# Patient Record
Sex: FEMALE | Race: WHITE | Employment: UNEMPLOYED | URBAN - METROPOLITAN AREA
[De-identification: names, ages, dates, MRNs, and addresses within clinical notes are randomized per-mention and may not be internally consistent; named-entity substitution may affect disease eponyms.]

---

## 2021-07-09 ENCOUNTER — HOSPITAL ENCOUNTER (EMERGENCY)
Facility: CLINIC | Age: 26
Discharge: HOME | End: 2021-07-09
Attending: EMERGENCY MEDICINE
Payer: MEDICAID

## 2021-07-09 VITALS
HEART RATE: 80 BPM | BODY MASS INDEX: 19.49 KG/M2 | TEMPERATURE: 98.8 F | DIASTOLIC BLOOD PRESSURE: 92 MMHG | HEIGHT: 63 IN | RESPIRATION RATE: 16 BRPM | WEIGHT: 110 LBS | SYSTOLIC BLOOD PRESSURE: 146 MMHG | OXYGEN SATURATION: 99 %

## 2021-07-09 DIAGNOSIS — B34.9 VIRAL ILLNESS: Primary | ICD-10-CM

## 2021-07-09 LAB
DIRECT EXAM: NORMAL
Lab: NORMAL
SPECIMEN DESCRIPTION: NORMAL

## 2021-07-09 PROCEDURE — U0003 INFECTIOUS AGENT DETECTION BY NUCLEIC ACID (DNA OR RNA); SEVERE ACUTE RESPIRATORY SYNDROME CORONAVIRUS 2 (SARS-COV-2) (CORONAVIRUS DISEASE [COVID-19]), AMPLIFIED PROBE TECHNIQUE, MAKING USE OF HIGH THROUGHPUT TECHNOLOGIES AS DESCRIBED BY CMS-2020-01-R: HCPCS

## 2021-07-09 PROCEDURE — 87880 STREP A ASSAY W/OPTIC: CPT

## 2021-07-09 PROCEDURE — 99282 EMERGENCY DEPT VISIT SF MDM: CPT

## 2021-07-09 PROCEDURE — U0005 INFEC AGEN DETEC AMPLI PROBE: HCPCS

## 2021-07-09 ASSESSMENT — ENCOUNTER SYMPTOMS
SHORTNESS OF BREATH: 1
BACK PAIN: 0
VOMITING: 0
COUGH: 1
DIARRHEA: 1
CONSTIPATION: 0
PHOTOPHOBIA: 0
RHINORRHEA: 0
NAUSEA: 1
ABDOMINAL PAIN: 0
SORE THROAT: 1

## 2021-07-09 NOTE — ED PROVIDER NOTES
[facility]  [address] [facility]  Phone: [phone]        Pt Name: Vita Ibrahim  MRN: [id number]  [place] 1995  Date of evaluation: 7/9/21      CHIEF COMPLAINT     Chief Complaint   Patient presents with    Concern For COVID-19         HISTORY OF PRESENT ILLNESS  (Location/Symptom, Timing/Onset, Context/Setting, Quality, Duration, Modifying Factors, Severity.)    Vita Ibrahim is a 27 y.o. female who presents with swollen lymph nodes, sneezing, fatigue, sore throat. No cough. She reports bilateral ear pain. She has not been vaccinated for Covid-19. She states her hands have been sweating and her fingers are swollen. She does have body aches. Her sense of smell is fine. She does have a headache. She states her temp has been running from  degrees F. She is currently in an inpatient facility for opiate abuse. REVIEW OF SYSTEMS    (2-9 systems for level 4, 10 or more for level 5)     Review of Systems   Constitutional: Positive for chills. Negative for fever. HENT: Positive for sore throat. Negative for congestion and rhinorrhea. Eyes: Negative for photophobia and visual disturbance. Respiratory: Positive for cough and shortness of breath. Cardiovascular: Positive for palpitations. Negative for chest pain. Gastrointestinal: Positive for diarrhea and nausea. Negative for abdominal pain, constipation and vomiting. Genitourinary: Negative for dysuria, frequency and urgency. Musculoskeletal: Positive for neck pain. Negative for back pain. Skin: Negative for rash and wound. Neurological: Positive for dizziness and headaches. Negative for light-headedness. Hematological: Negative for adenopathy. Does not bruise/bleed easily. PAST MEDICAL HISTORY    has no past medical history on file. Opiate abuse    SURGICAL HISTORY      has no past surgical history on file.    [place] teeth, pilonidal cyst excision    CURRENTMEDICATIONS       Previous Medications    PRAZOSIN HCL PO    Take by mouth       ALLERGIES     is allergic to naloxone. FAMILY HISTORY     has no family status information on file. family history is not on file. SOCIAL HISTORY      reports that she has been smoking cigarettes. She has been smoking about 0.50 packs per day. She has never used smokeless tobacco. She reports previous drug use. PHYSICAL EXAM    (up to 7 for level 4, 8 or more for level 5)   INITIAL VITALS:  height is 5' 3\" (1.6 m) and weight is 49.9 kg (110 lb). Her oral temperature is 98.8 °F (37.1 °C). Her blood pressure is 146/92 (abnormal) and her pulse is 80. Her respiration is 16 and oxygen saturation is 99%. Physical Exam  Vitals and nursing note reviewed. Constitutional:       Appearance: Normal appearance. HENT:      Head: Normocephalic and atraumatic. Right Ear: Tympanic membrane, ear canal and external ear normal.      Left Ear: Tympanic membrane, ear canal and external ear normal.      Nose: Nose normal.      Mouth/Throat:      Mouth: Mucous membranes are moist.      Pharynx: Posterior oropharyngeal erythema present. No oropharyngeal exudate. Eyes:      Extraocular Movements: Extraocular movements intact. Conjunctiva/sclera: Conjunctivae normal.      Pupils: Pupils are equal, round, and reactive to light. Cardiovascular:      Rate and Rhythm: Normal rate and regular rhythm. Pulses: Normal pulses. Heart sounds: Normal heart sounds. No murmur heard. No friction rub. No gallop. Pulmonary:      Effort: Pulmonary effort is normal.      Breath sounds: Normal breath sounds. No wheezing, rhonchi or rales. Abdominal:      General: Abdomen is flat. Bowel sounds are normal.      Palpations: Abdomen is soft. Tenderness: There is no abdominal tenderness. There is no guarding or rebound. Musculoskeletal:         General: Normal range of motion. Cervical back: Normal range of motion and neck supple. No rigidity. Right lower leg: No edema. Left lower leg: No edema. Lymphadenopathy:      Cervical: Cervical adenopathy present. Skin:     General: Skin is warm and dry. Capillary Refill: Capillary refill takes less than 2 seconds. Neurological:      Mental Status: She is alert and oriented to person, place, and time. Mental status is at baseline. Psychiatric:         Mood and Affect: Mood normal.         Behavior: Behavior normal.         Thought Content: Thought content normal.         DIFFERENTIAL DIAGNOSIS/ MDM:     The patient presents with viral symptoms that may represent COVID-19. However at this time, all vital signs are stable and the patient is not hypoxic or tachypneic. Presenting  symptoms are currently mild and not life, limb or organ threatening. The patient presents with upper respiratory symptoms that are not suggestive in nature of pulmonary embolus, cardiac ischemia, meningitis, epiglottis, airway obstruction or other serious etiology. Given the extremely low risk of these diagnoses further testing and evaluation for these possibilites are not indicated at this time. In my opinion, the patient currently does not have an emergency medical condition as defined by EMTALA. I have stressed to the patient that while their current symptoms may not be severe to them, they are highly contagious and should self quarantine for at least 10 days from the onset of symptoms or 72 hours after resolution of their fever without any antipyretic medications (whichever is shorter). Isolation strategies have been discussed and reinforced to protect their friends and families. I have answered questions asked to the patients satisfaction.  They have been instructed to immediately return to the emergency department if symptoms worsen including but not limited to shortness of breath, chest pain, a feeling of passing out,light headed or dizziness, any neurologic symptoms, abdominal pain, or persistent nausea, vomiting and/or diarrhea. I have stressed the importance of close followup with their primary care physicians via telemedicine. The patient has voiced understanding of these instructions and does not offer any further questions or complaints. The patient understands that at this time there is no evidence for a more malignant underlying process, but the patient also understands that early in the process of an illness or injury, an emergency department workup can be falsely reassuring. Routine discharge counseling was given, and the patient understands that worsening, changing or persistent symptoms should prompt an immediate call or follow up with their primary physician or return to the emergency department. The importance of appropriate follow up was also discussed. I have reviewed the disposition diagnosis with the patient and or their family/guardian. I have answered their questions and given discharge instructions. They voiced understanding of these instructions and did not have any further questions or complaints. DIAGNOSTIC RESULTS     EKG: All EKG's are interpreted by the Emergency Department Physician who either signs or Co-signs this chart in the absence of a cardiologist.    none    RADIOLOGY:        Interpretation per the Radiologist below, if available at the time of this note:    none    LABS:  No results found for this visit on 07/09/21. pending    EMERGENCY DEPARTMENT COURSE:   Vitals:    Vitals:    07/09/21 1808   BP: (!) 146/92   Pulse: 80   Resp: 16   Temp: 98.8 °F (37.1 °C)   TempSrc: Oral   SpO2: 99%   Weight: 49.9 kg (110 lb)   Height: 5' 3\" (1.6 m)     -------------------------  BP: (!) 146/92, Temp: 98.8 °F (37.1 °C), Pulse: 80, Resp: 16          CONSULTS:  none    PROCEDURES:  None    FINAL IMPRESSION    No diagnosis found. DISPOSITION/PLAN   DISPOSITION        CONDITION ON DISPOSITION:   Stable     PATIENT REFERRED TO:  No follow-up provider specified.     DISCHARGE MEDICATIONS:  New Prescriptions    No medications on file       (Please note that portions of this note were completed with a voicerecognition program.  Efforts were made to edit the dictations but occasionally words are mis-transcribed.)    Jenise Foster MD  Attending Emergency Medicine Physician       Jenise Foster MD  07/09/21 6129

## 2021-07-09 NOTE — ED PROVIDER NOTES
[facility] ED  [address] [facility]  Phone: [phone]        ADDENDUM:      Care of this patient was assumed from Dr Parke Goldberg The patient was seen for Concern For COVID-19  . The patient's initial evaluation and plan have been discussed with the prior provider who initially evaluated the patient. Nursing Notes, Past Medical Hx, Past Surgical Hx, Allergies, were all reviewed. PAST MEDICAL HISTORY    has no past medical history on file. SURGICAL HISTORY      has no past surgical history on file. CURRENT MEDICATIONS       Previous Medications    PRAZOSIN HCL PO    Take by mouth       ALLERGIES     is allergic to naloxone. Diagnostic Results     EKG: All EKG's are interpreted by the Emergency Department Physician who either signs or Co-signs this chart in the [address] a cardiologist.        Lo Clark:   Results for orders placed or performed during the hospital encounter of 07/09/21   Strep Screen Group A Throat    Specimen: Throat   Result Value Ref Range    Specimen Description . THROAT     Special Requests NOT REPORTED     Direct Exam       Rapid Strep A negative. A negative Rapid Group A Strep Screen result does not rule out the possibility of Group A Streptococci in the specimen. A Group A Strep DNA test is available upon request.       RADIOLOGY:  No orders to display       RECENT VITALS:  BP: (!) 146/92, Temp: 98.8 °F (37.1 °C), Pulse: 80, Resp: 16     ED Course     The patient was given the following medications:  No orders of the defined types were placed in this encounter.       Medical Decision Making               EMERGENCY DEPARTMENT COURSE:   Vitals:    Vitals:    07/09/21 1808   BP: (!) 146/92   Pulse: 80   Resp: 16   Temp: 98.8 °F (37.1 °C)   TempSrc: Oral   SpO2: 99%   Weight: 49.9 kg (110 lb)   Height: 5' 3\" (1.6 m)     -------------------------  BP: (!) 146/92, Temp: 98.8 °F (37.1 °C), Pulse: 80, Resp: 16      RE-EVALUATION:  Patient doing well, strep negative, follow up as directed    CONSULTS:      PROCEDURES:  None    FINAL IMPRESSION      1. Viral illness          DISPOSITION/PLAN   DISPOSITION Decision To Discharge 07/09/2021 07:47:15 PM      CONDITION ON DISPOSITION:   Stable      PATIENT REFERRED TO:  SHON Finch NP  [address] [facility] [phone]    In 2 days        DISCHARGE MEDICATIONS:  New Prescriptions    No medications on file       (Please note that portions of this note were completed with a voicerecognition program.  Efforts were made to edit the dictations but occasionally words are mis-transcribed.)    Kady Morrison MD,, MD, F.A.C.E.P.   Attending Emergency Medicine Physician                   Kady Morrison MD  07/09/21 2944

## 2021-07-11 LAB
SARS-COV-2: NORMAL
SARS-COV-2: NOT DETECTED
SOURCE: NORMAL

## 2021-07-12 ENCOUNTER — CARE COORDINATION (OUTPATIENT)
Dept: CARE COORDINATION | Age: 26
End: 2021-07-12

## 2021-07-12 NOTE — CARE COORDINATION
First attempt to reach pt for covid risk education s/p er visit left vm to call [facility] [phone]